# Patient Record
Sex: FEMALE | Race: WHITE | NOT HISPANIC OR LATINO | Employment: UNEMPLOYED | URBAN - METROPOLITAN AREA
[De-identification: names, ages, dates, MRNs, and addresses within clinical notes are randomized per-mention and may not be internally consistent; named-entity substitution may affect disease eponyms.]

---

## 2024-01-22 ENCOUNTER — APPOINTMENT (OUTPATIENT)
Dept: GENERAL RADIOLOGY | Facility: HOSPITAL | Age: 39
End: 2024-01-22
Payer: COMMERCIAL

## 2024-01-22 ENCOUNTER — HOSPITAL ENCOUNTER (EMERGENCY)
Facility: HOSPITAL | Age: 39
Discharge: HOME | End: 2024-01-22
Attending: EMERGENCY MEDICINE
Payer: COMMERCIAL

## 2024-01-22 VITALS
DIASTOLIC BLOOD PRESSURE: 97 MMHG | HEART RATE: 101 BPM | OXYGEN SATURATION: 100 % | SYSTOLIC BLOOD PRESSURE: 131 MMHG | WEIGHT: 150 LBS | TEMPERATURE: 98.4 F | RESPIRATION RATE: 15 BRPM | BODY MASS INDEX: 28.32 KG/M2 | HEIGHT: 61 IN

## 2024-01-22 DIAGNOSIS — R00.2 PALPITATIONS: ICD-10-CM

## 2024-01-22 DIAGNOSIS — R00.0 SINUS TACHYCARDIA: Primary | ICD-10-CM

## 2024-01-22 LAB
ALBUMIN SERPL-MCNC: 4.8 G/DL (ref 3.5–5.2)
ALBUMIN/GLOB SERPL: 1.7 G/DL
ALP SERPL-CCNC: 108 U/L (ref 39–117)
ALT SERPL W P-5'-P-CCNC: 10 U/L (ref 1–33)
ANION GAP SERPL CALCULATED.3IONS-SCNC: 9.3 MMOL/L (ref 5–15)
AST SERPL-CCNC: 13 U/L (ref 1–32)
BASOPHILS # BLD AUTO: 0.04 10*3/MM3 (ref 0–0.2)
BASOPHILS NFR BLD AUTO: 0.6 % (ref 0–1.5)
BILIRUB SERPL-MCNC: 0.3 MG/DL (ref 0–1.2)
BUN SERPL-MCNC: 15 MG/DL (ref 6–20)
BUN/CREAT SERPL: 19.2 (ref 7–25)
CALCIUM SPEC-SCNC: 10.1 MG/DL (ref 8.6–10.5)
CHLORIDE SERPL-SCNC: 101 MMOL/L (ref 98–107)
CO2 SERPL-SCNC: 25.7 MMOL/L (ref 22–29)
CREAT SERPL-MCNC: 0.78 MG/DL (ref 0.57–1)
D DIMER PPP FEU-MCNC: 0.14 MCGFEU/ML (ref 0–0.5)
DEPRECATED RDW RBC AUTO: 38.6 FL (ref 37–54)
EGFRCR SERPLBLD CKD-EPI 2021: 99.8 ML/MIN/1.73
EOSINOPHIL # BLD AUTO: 0.04 10*3/MM3 (ref 0–0.4)
EOSINOPHIL NFR BLD AUTO: 0.6 % (ref 0.3–6.2)
ERYTHROCYTE [DISTWIDTH] IN BLOOD BY AUTOMATED COUNT: 12.1 % (ref 12.3–15.4)
GLOBULIN UR ELPH-MCNC: 2.8 GM/DL
GLUCOSE SERPL-MCNC: 99 MG/DL (ref 65–99)
HCG SERPL QL: NEGATIVE
HCT VFR BLD AUTO: 42.1 % (ref 34–46.6)
HGB BLD-MCNC: 14.3 G/DL (ref 12–15.9)
IMM GRANULOCYTES # BLD AUTO: 0.02 10*3/MM3 (ref 0–0.05)
IMM GRANULOCYTES NFR BLD AUTO: 0.3 % (ref 0–0.5)
LYMPHOCYTES # BLD AUTO: 1.64 10*3/MM3 (ref 0.7–3.1)
LYMPHOCYTES NFR BLD AUTO: 22.9 % (ref 19.6–45.3)
MCH RBC QN AUTO: 29.4 PG (ref 26.6–33)
MCHC RBC AUTO-ENTMCNC: 34 G/DL (ref 31.5–35.7)
MCV RBC AUTO: 86.6 FL (ref 79–97)
MONOCYTES # BLD AUTO: 0.64 10*3/MM3 (ref 0.1–0.9)
MONOCYTES NFR BLD AUTO: 9 % (ref 5–12)
NEUTROPHILS NFR BLD AUTO: 4.77 10*3/MM3 (ref 1.7–7)
NEUTROPHILS NFR BLD AUTO: 66.6 % (ref 42.7–76)
PLATELET # BLD AUTO: 282 10*3/MM3 (ref 140–450)
PMV BLD AUTO: 10.1 FL (ref 6–12)
POTASSIUM SERPL-SCNC: 3.5 MMOL/L (ref 3.5–5.2)
PROT SERPL-MCNC: 7.6 G/DL (ref 6–8.5)
QT INTERVAL: 262 MS
QTC INTERVAL: 410 MS
RBC # BLD AUTO: 4.86 10*6/MM3 (ref 3.77–5.28)
SODIUM SERPL-SCNC: 136 MMOL/L (ref 136–145)
T3 SERPL-MCNC: 123 NG/DL (ref 80–200)
T4 SERPL-MCNC: 7.94 MCG/DL (ref 4.5–11.7)
TROPONIN T SERPL HS-MCNC: <6 NG/L
TSH SERPL DL<=0.05 MIU/L-ACNC: 4.72 UIU/ML (ref 0.27–4.2)
WBC NRBC COR # BLD AUTO: 7.15 10*3/MM3 (ref 3.4–10.8)

## 2024-01-22 PROCEDURE — 84436 ASSAY OF TOTAL THYROXINE: CPT | Performed by: EMERGENCY MEDICINE

## 2024-01-22 PROCEDURE — 84703 CHORIONIC GONADOTROPIN ASSAY: CPT | Performed by: EMERGENCY MEDICINE

## 2024-01-22 PROCEDURE — 25810000003 SODIUM CHLORIDE 0.9 % SOLUTION: Performed by: EMERGENCY MEDICINE

## 2024-01-22 PROCEDURE — 96360 HYDRATION IV INFUSION INIT: CPT

## 2024-01-22 PROCEDURE — 85379 FIBRIN DEGRADATION QUANT: CPT | Performed by: EMERGENCY MEDICINE

## 2024-01-22 PROCEDURE — 99284 EMERGENCY DEPT VISIT MOD MDM: CPT | Performed by: EMERGENCY MEDICINE

## 2024-01-22 PROCEDURE — 84484 ASSAY OF TROPONIN QUANT: CPT | Performed by: EMERGENCY MEDICINE

## 2024-01-22 PROCEDURE — 99284 EMERGENCY DEPT VISIT MOD MDM: CPT

## 2024-01-22 PROCEDURE — 93005 ELECTROCARDIOGRAM TRACING: CPT | Performed by: EMERGENCY MEDICINE

## 2024-01-22 PROCEDURE — 80050 GENERAL HEALTH PANEL: CPT | Performed by: EMERGENCY MEDICINE

## 2024-01-22 PROCEDURE — 84480 ASSAY TRIIODOTHYRONINE (T3): CPT | Performed by: EMERGENCY MEDICINE

## 2024-01-22 PROCEDURE — 71045 X-RAY EXAM CHEST 1 VIEW: CPT

## 2024-01-22 PROCEDURE — 93010 ELECTROCARDIOGRAM REPORT: CPT | Performed by: INTERNAL MEDICINE

## 2024-01-22 RX ORDER — SODIUM CHLORIDE 0.9 % (FLUSH) 0.9 %
10 SYRINGE (ML) INJECTION AS NEEDED
Status: DISCONTINUED | OUTPATIENT
Start: 2024-01-22 | End: 2024-01-22 | Stop reason: HOSPADM

## 2024-01-22 RX ORDER — DESVENLAFAXINE SUCCINATE 50 MG/1
50 TABLET, EXTENDED RELEASE ORAL DAILY
COMMUNITY

## 2024-01-22 RX ORDER — LEVOTHYROXINE SODIUM 0.05 MG/1
50 TABLET ORAL DAILY
COMMUNITY

## 2024-01-22 RX ORDER — HYDROXYZINE HYDROCHLORIDE 25 MG/1
25 TABLET, FILM COATED ORAL EVERY 4 HOURS PRN
COMMUNITY

## 2024-01-22 RX ADMIN — SODIUM CHLORIDE 1000 ML: 9 INJECTION, SOLUTION INTRAVENOUS at 08:45

## 2024-01-22 RX ADMIN — METOPROLOL TARTRATE 12.5 MG: 25 TABLET, FILM COATED ORAL at 10:17

## 2024-01-22 NOTE — DISCHARGE INSTRUCTIONS
Today your evaluation is very stable.  Specifically there is no evidence of a heart attack.  Likewise there is no evidence of a blood clot to the lung.    I did review your previous clinic visits.  You have persistently had an elevated heart rate.  I am going to initiate a twice daily low-dose beta-blocker called metoprolol.  It will be 1/2 tablet twice daily.  You did receive your first dose here today.  You may take your second dose this evening.    I sent an internal referral to cardiology.  They should call you on the telephone to help set up a follow-up appointment.    Return anytime for increased pain, palpitations, other difficulties    Please read all of the instructions in this handout.  If you receive prescriptions please fill them and take them as directed.  Please call your primary care physician for follow-up appointment in the next 5 to 7 days.  If you do not have a physician you may call the [facility] referral line at [phone].    You may return to the emergency department at any time for any concerns such as worsening symptoms.  If you received a work or school note it will be printed at the back of this packet.

## 2024-01-22 NOTE — FSED PROVIDER NOTE
"Subjective   History of Present Illness  Patient is a very nice 38-year-old white female.  She presents with an approximately 1 week history of rapid palpitations.  She states that over the last 24 hours she has felt a \"pinch\" in her left anterior chest.  Mild shortness of breath.  No calf pain or swelling.  Patient denies any increase in stimulants.  She actually states that she has reduced her caffeine intake over the last week.  No personal history of DVT or PE.  No family history of clotting disorder that she is aware of.  She does have a history of hypothyroidism and is on low-dose thyroid replacement.  No fever no chills or respiratory illness.      Review of Systems  Constitutional: No fevers, chills, sweats unless otherwise documented in HPI  Eyes: No recent visual problems, eye discharge, eye pain, redness unless otherwise documented in HPI  HEENT: No ear pain, nasal congestion, sore throat, voice changes unless otherwise documented in HPI  Respiratory: No shortness of breath, cough, pain on breathing, sputum production unless otherwise documented in HPI  Cardiovascular: No chest pain, palpitations, syncope, orthopnea unless otherwise documented in HPI  Gastrointestinal: No nausea, vomiting, diarrhea, constipation unless otherwise documented in HPI  Genitourinary: No hematuria, dysuria, incontinence unless otherwise documented in HPI  Endocrine: Negative for excessive thirst, excessive hunger, excessive urination, heat or cold intolerance unless otherwise documented in HPI  Musculoskeletal: No back pain, neck pain, joint pain, muscle pain, decreased range of motion unless otherwise documented in HPI  Integumentary: No rash, pruritus, abrasion, lesions unless otherwise documented in HPI  Neurologic: No weakness, numbness, frequent headaches, tremors unless otherwise documented in HPI  Psychiatric: No anxiety, depression, mood changes, hallucinations unless otherwise documented in HPI        History reviewed. " No pertinent past medical history.    Allergies   Allergen Reactions    Codeine Hallucinations    Amoxicillin Rash    Sulfamethoxazole-Trimethoprim Rash       History reviewed. No pertinent surgical history.    Family History   Problem Relation Age of Onset    No Known Problems Mother     No Known Problems Father        Social History     Socioeconomic History    Marital status:    Tobacco Use    Smoking status: Never     Passive exposure: Never    Smokeless tobacco: Never   Vaping Use    Vaping Use: Never used   Substance and Sexual Activity    Alcohol use: Not Currently    Drug use: Never    Sexual activity: Never           Objective   Physical Exam  Vital signs: Reviewed in nurses notes    General: Awake alert.  No acute distress.  She is slightly anxious at bedside    HEENT: Pupils equal round responsive to light.  Extra-ocular movements are intact.  No scleral icterus.  Nasopharynx is clear.  Oropharynx is clear with moist mucous membranes.  No masses noted    Neck:   Supple without lymphadenopathy.  No elevation of JVD    Respiratory:   Nonlabored respirations.  Clear to auscultation bilaterally.  Equal breath sounds bilaterally.  No wheezes or stridor noted.    Cardiovascular: Rate is tachycardic, rhythm regular.  I do not appreciate a murmur.  No pretibial or pedal edema.  No calf pain.  Pedal pulses are intact bilaterally    Abdomen: Nondistended    Skin:   Warm and dry.  No rashes noted    Neurological examination: Patient is awake alert oriented x4.  Speech is normal.  No facial palsy.  No focal motor or sensory deficits.    ECG 12 Lead      Date/Time: 1/22/2024 8:36 AM    Performed by: Simeon Gloria MD  Authorized by: Simeon Gloria MD  Interpreted by physician  Rhythm: sinus tachycardia  Comments: Sinus tachycardia rate of 147.  Nonspecific ST changes      Lab Results (last 72 hours)       Procedure Component Value Units Date/Time    CBC & Differential [[id number]]  (Abnormal)  Collected: 01/22/24 0841    Specimen: Blood Updated: 01/22/24 0846    Narrative:      The following orders were created for panel order CBC & Differential.  Procedure                               Abnormality         Status                     ---------                               -----------         ------                     CBC Auto Differential[[id number]]        Abnormal            Final result                 Please view results for these tests on the individual orders.    Comprehensive Metabolic Panel [[id number]] Collected: 01/22/24 0841    Specimen: Blood Updated: 01/22/24 0914     Glucose 99 mg/dL      BUN 15 mg/dL      Creatinine 0.78 mg/dL      Sodium 136 mmol/L      Potassium 3.5 mmol/L      Chloride 101 mmol/L      CO2 25.7 mmol/L      Calcium 10.1 mg/dL      Total Protein 7.6 g/dL      Albumin 4.8 g/dL      ALT (SGPT) 10 U/L      AST (SGOT) 13 U/L      Alkaline Phosphatase 108 U/L      Total Bilirubin 0.3 mg/dL      Globulin 2.8 gm/dL      A/G Ratio 1.7 g/dL      BUN/Creatinine Ratio 19.2     Anion Gap 9.3 mmol/L      eGFR 99.8 mL/min/1.73     Narrative:      GFR Normal >60  Chronic Kidney Disease <60  Kidney Failure <15      High Sensitivity Troponin T [[id number]]  (Normal) Collected: 01/22/24 0841    Specimen: Blood Updated: 01/22/24 0914     HS Troponin T <6 ng/L     Narrative:      High Sensitive Troponin T Reference Range:  <14.0 ng/L- Negative Female for AMI  <22.0 ng/L- Negative Male for AMI  >=14 - Abnormal Female indicating possible myocardial injury.  >=22 - Abnormal Male indicating possible myocardial injury.   Clinicians would have to utilize clinical acumen, EKG, Troponin, and serial changes to determine if it is an Acute Myocardial Infarction or myocardial injury due to an underlying chronic condition.         hCG, Serum, Qualitative [[id number]]  (Normal) Collected: 01/22/24 0841    Specimen: Blood Updated: 01/22/24 0905     HCG Qualitative Negative    CBC Auto Differential [[id number]]   "(Abnormal) Collected: 01/22/24 0841    Specimen: Blood Updated: 01/22/24 0846     WBC 7.15 10*3/mm3      RBC 4.86 10*6/mm3      Hemoglobin 14.3 g/dL      Hematocrit 42.1 %      MCV 86.6 fL      MCH 29.4 pg      MCHC 34.0 g/dL      RDW 12.1 %      RDW-SD 38.6 fl      MPV 10.1 fL      Platelets 282 10*3/mm3      Neutrophil % 66.6 %      Lymphocyte % 22.9 %      Monocyte % 9.0 %      Eosinophil % 0.6 %      Basophil % 0.6 %      Immature Grans % 0.3 %      Neutrophils, Absolute 4.77 10*3/mm3      Lymphocytes, Absolute 1.64 10*3/mm3      Monocytes, Absolute 0.64 10*3/mm3      Eosinophils, Absolute 0.04 10*3/mm3      Basophils, Absolute 0.04 10*3/mm3      Immature Grans, Absolute 0.02 10*3/mm3     TSH [[id number]] Collected: 01/22/24 0841    Specimen: Blood Updated: 01/22/24 0845    T4 [[id number]] Collected: 01/22/24 0841    Specimen: Blood Updated: 01/22/24 0845    T3 [[id number]] Collected: 01/22/24 0841    Specimen: Blood Updated: 01/22/24 0845    D-dimer, Quantitative [[id number]]  (Normal) Collected: 01/22/24 0845    Specimen: Blood Updated: 01/22/24 0904     D-Dimer, Quantitative 0.14 MCGFEU/mL     Narrative:      According to the assay 's published package insert, a normal (<0.50 MCGFEU/mL) D-dimer result in conjunction with a non-high clinical probability assessment, excludes deep vein thrombosis (DVT) and pulmonary embolism (PE) with high sensitivity.    D-dimer values increase with age and this can make VTE exclusion of an older population difficult. To address this, the [employer], based on best available evidence and recent guidelines, recommends that clinicians use age-adjusted D-dimer thresholds in patients greater than 50 years of age with: a) a low probability of PE who do not meet all Pulmonary Embolism Rule Out Criteria, or b) in those with intermediate probability of PE.   The formula for an age-adjusted D-dimer cut-off is \"age/100\".  For example, a 60 year old patient " would have an age-adjusted cut-off of 0.60 MCGFEU/mL and an 80 year old 0.80 MCGFEU/mL.             XR Chest 1 View    Result Date: 1/22/2024  PORTABLE CHEST X-RAY  HISTORY: Palpitations with fluttering sensation in the chest.  Portable chest x-ray is provided. Correlation: None.  FINDINGS: The cardiomediastinal silhouette is normal. The lungs are clear. The costophrenic sulci are dry and the bones appear normal. There is no pneumothorax.      Negative.  This report was finalized on 1/22/2024 8:57 AM by Dr. Michael Hinojosa M.D on Workstation: [id number]        Medications   sodium chloride 0.9 % flush 10 mL (has no administration in time range)   metoprolol tartrate (LOPRESSOR) tablet 12.5 mg (has no administration in time range)   sodium chloride 0.9 % bolus 1,000 mL (1,000 mL Intravenous New Bag 1/22/24 0845)             ED Course  ED Course as of 01/22/24 1013   Mon Jan 22, 2024   1005 Patient is very stable at this time.  Current SaO2 is 100% on room air, heart rate 97.  I did review many of her previous clinic visits on [facility].  Her heart rate has persistently been elevated.  For example on 11/3/2023 was 119, on 10/10/2023 was 135, on 7/17/2023 was 117.  She likewise has had some consistent slight elevations of blood pressure.    Plan: She appears to be very stable today.  Her D-dimer is negative, troponin negative.  I am going to initiate a low-dose Lopressor twice daily at 12.5 with cardiology referral. [TC]      ED Course User Index  [TC] Simeon Gloria MD                                         DDx: Sinus arrhythmia, SVT, PE, atypical ischemia, hyper thyroidism  Medical Decision Making  Problems Addressed:  Palpitations: complicated acute illness or injury  Sinus tachycardia: complicated acute illness or injury    Amount and/or Complexity of Data Reviewed  Labs: ordered.  Radiology: ordered.  ECG/medicine tests: ordered.    Risk  Prescription drug management.    The x-rays were independently  reviewed as well as review of the radiologist interpretation  Upon discharge patient noted to be very stable.  Appropriate treatment plan and return precautions discussed    Final diagnoses:   Sinus tachycardia   Palpitations       ED Disposition  ED Disposition       ED Disposition   Discharge    Condition   Stable    Comment   --               Marleny Morales MD  [address] [id number] [phone]               Medication List        New Prescriptions      metoprolol tartrate 25 MG tablet  Commonly known as: LOPRESSOR  1/2 tab po bid               Where to Get Your Medications        These medications were sent to [facility] [id number] - [place], [place] - [address] AT [facility] - [phone]  - [phone]   [address] , [facility] [address]      Phone: [phone]   metoprolol tartrate 25 MG tablet

## 2024-01-31 ENCOUNTER — OFFICE VISIT (OUTPATIENT)
Dept: CARDIOLOGY | Facility: CLINIC | Age: 39
End: 2024-01-31
Payer: COMMERCIAL

## 2024-01-31 VITALS
DIASTOLIC BLOOD PRESSURE: 92 MMHG | HEIGHT: 61 IN | OXYGEN SATURATION: 100 % | BODY MASS INDEX: 28.89 KG/M2 | HEART RATE: 85 BPM | WEIGHT: 153 LBS | SYSTOLIC BLOOD PRESSURE: 136 MMHG

## 2024-01-31 DIAGNOSIS — R00.0 SINUS TACHYCARDIA: Primary | ICD-10-CM

## 2024-01-31 DIAGNOSIS — R00.2 PALPITATIONS: ICD-10-CM

## 2024-01-31 PROCEDURE — 93000 ELECTROCARDIOGRAM COMPLETE: CPT | Performed by: INTERNAL MEDICINE

## 2024-01-31 PROCEDURE — 99204 OFFICE O/P NEW MOD 45 MIN: CPT | Performed by: INTERNAL MEDICINE

## 2024-01-31 NOTE — PROGRESS NOTES
"      CARDIOLOGY    Marleny Morales MD    ENCOUNTER DATE:  01/31/2024    Marleny Wen / 38 y.o. / female        CHIEF COMPLAINT / REASON FOR OFFICE VISIT     sinus tachycardia      HISTORY OF PRESENT ILLNESS       HPI    Marleny Wen is a 38 y.o. female     This is a nice lady who was noticing palpitations for about 2 weeks and usually it was first thing in the morning when she woke up and it would last for about an hour.  No associated symptoms.  She woke up the morning of 01/22/2024 and had a pinching type pain in her chest so she went to the emergency room where she had a negative D-dimer and troponin.  She was not anemic.  TSH was a little high.  Blood pressure was elevated.  She was started on metoprolol tartrate 12.5 mg bid.  Since then, her heart rate has been better.  She has not had any palpitations.  She notices her heart does not go as fast when she is on the treadmill.  She was having some neck pain but that has gone away since being on metoprolol as well.  No exertional symptoms.          REVIEW OF SYSTEMS     Review of Systems   Constitutional: Negative for chills, fever, weight gain and weight loss.   Cardiovascular:  Negative for leg swelling.   Respiratory:  Positive for snoring. Negative for cough and wheezing.    Hematologic/Lymphatic: Negative for bleeding problem. Does not bruise/bleed easily.   Skin:  Negative for color change.   Musculoskeletal:  Negative for falls, joint pain and myalgias.   Gastrointestinal:  Negative for melena.   Genitourinary:  Negative for hematuria.   Neurological:  Negative for excessive daytime sleepiness.   Psychiatric/Behavioral:  Negative for depression. The patient is nervous/anxious.          VITAL SIGNS     Visit Vitals  /92   Pulse 85   Ht 153.7 cm (60.5\")   Wt 69.4 kg (153 lb)   LMP 01/15/2024   SpO2 100%   BMI 29.39 kg/m²         Wt Readings from Last 3 Encounters:   01/31/24 69.4 kg (153 lb)   01/22/24 68 kg (150 lb)   11/15/22 71.2 kg (157 " lb)     Body mass index is 29.39 kg/m².      PHYSICAL EXAMINATION     Constitutional:       General: Not in acute distress.  Neck:      Vascular: No carotid bruit or JVD.   Pulmonary:      Effort: Pulmonary effort is normal.      Breath sounds: Normal breath sounds.   Cardiovascular:      Normal rate. Regular rhythm.      Murmurs: There is no murmur.   Psychiatric:         Mood and Affect: Mood and affect normal.           REVIEWED DATA       ECG 12 Lead    Date/Time: 1/31/2024 9:43 AM  Performed by: Marleny Morales MD    Authorized by: Marleny Morales MD  Comparison: compared with previous ECG from 1/22/2024  Comparison to previous ECG: Heart rate is slower  Rhythm: sinus rhythm  BPM: 85  Conduction: conduction normal  Other findings: non-specific ST-T wave changes    Clinical impression: abnormal EKG                Lab Results   Component Value Date    GLUCOSE 99 01/22/2024    BUN 15 01/22/2024    CREATININE 0.78 01/22/2024    EGFR 99.8 01/22/2024    BCR 19.2 01/22/2024    K 3.5 01/22/2024    CO2 25.7 01/22/2024    CALCIUM 10.1 01/22/2024    ALBUMIN 4.8 01/22/2024    BILITOT 0.3 01/22/2024    AST 13 01/22/2024    ALT 10 01/22/2024       ASSESSMENT & PLAN      Diagnosis Plan   1. Sinus tachycardia  Adult Transthoracic Echo Complete W/ Cont if Necessary Per Protocol      2. Palpitations  Adult Transthoracic Echo Complete W/ Cont if Necessary Per Protocol          Tachycardia.  I reviewed her EKG from the ER and she was in sinus tachycardia in the 140's with nonspecific ST changes, potentially polarization abnormality.  Her heart rate is much better today.  She is feeling better on the metoprolol.    Hypertension.  Her blood pressure has been elevated.  She has been monitoring it at home and has been getting similar numbers as we got here today.  I am going to increase the metoprolol to 25 mg b.i.d. with the intention of eventually switching that over to metoprolol succinate for ease of taking.  She may need an  additional medication to get her blood pressure under better control.  I would consider probably adding some amlodipine.   Abnormal EKG.  She has some nonspecific ST changes which could be repolarization.  I am going to check an echocardiogram to assess her wall motion and wall thickness.    Anxiety.    Hypothyroid, on replacement with slightly elevated TSH.      She is going to followup with one of our nurse practitioners in about 2 weeks and get an echocardiogram as well.          Orders Placed This Encounter   Procedures    ECG 12 Lead     This order was created via procedure documentation     Order Specific Question:   Release to patient     Answer:   Routine Release [[id number]]    Adult Transthoracic Echo Complete W/ Cont if Necessary Per Protocol     Standing Status:   Future     Standing Expiration Date:   1/30/2025     Order Specific Question:   Reason for exam?     Answer:   Palpitations     Order Specific Question:   Release to patient     Answer:   Routine Release [[id number]]           MEDICATIONS         Discharge Medications            Accurate as of January 31, 2024  9:44 AM. If you have any questions, ask your nurse or doctor.                Changes to Medications        Instructions Start Date   metoprolol tartrate 25 MG tablet  Commonly known as: LOPRESSOR  What changed:   how much to take  how to take this  when to take this  additional instructions  Changed by: Marleny Morales MD   25 mg, Oral, 2 Times Daily             Continue These Medications        Instructions Start Date   Atogepant 30 MG tablet   30 mg, Oral, Daily      cetirizine 5 MG tablet  Commonly known as: zyrTEC   5 mg, Oral, Daily      desvenlafaxine 50 MG 24 hr tablet  Commonly known as: PRISTIQ   50 mg, Oral, Daily      hydrOXYzine 25 MG tablet  Commonly known as: ATARAX   25 mg, Oral, Every 4 Hours PRN      levothyroxine 50 MCG tablet  Commonly known as: SYNTHROID, LEVOTHROID   50 mcg, Oral, Daily      Rimegepant Sulfate 75  MG tablet dispersible tablet  Commonly known as: NURTEC   1 tablet, Oral, Daily PRN             Stop These Medications      Danie Thyroid 30 MG tablet  Generic drug: Thyroid  Stopped by: Marleny Morales MD     busPIRone 15 MG tablet  Commonly known as: BUSPAR  Stopped by: Marleny Morales MD     ipratropium 0.06 % nasal spray  Commonly known as: ATROVENT  Stopped by: Marleny Morales MD     methylPREDNISolone 4 MG dose pack  Commonly known as: MEDROL  Stopped by: Marleny Morales MD     montelukast 10 MG tablet  Commonly known as: SINGULAIR  Stopped by: Marleny Morales MD     Progesterone 100 MG capsule  Commonly known as: PROMETRIUM  Stopped by: Marleny Morales MD     promethazine-dextromethorphan 6.25-15 MG/5ML syrup  Commonly known as: PROMETHAZINE-DM  Stopped by: Marleny Morales MD     sertraline 50 MG tablet  Commonly known as: ZOLOFT  Stopped by: Marleny Morales MD     SUMAtriptan 50 MG tablet  Commonly known as: IMITREX  Stopped by: MD Marleny Morris MD  01/31/24  09:44 EST    Part of this note may be an electronic transcription/translation of spoken language to printed text using the Dragon dictation system.

## 2024-02-02 ENCOUNTER — PATIENT ROUNDING (BHMG ONLY) (OUTPATIENT)
Dept: CARDIOLOGY | Facility: CLINIC | Age: 39
End: 2024-02-02
Payer: COMMERCIAL

## 2024-02-02 NOTE — PROGRESS NOTES
A My Chart message has been sent to the patient for PATIENT ROUNDING with [facility]

## 2024-02-15 ENCOUNTER — HOSPITAL ENCOUNTER (OUTPATIENT)
Dept: CARDIOLOGY | Facility: HOSPITAL | Age: 39
Discharge: HOME | End: 2024-02-15
Admitting: INTERNAL MEDICINE
Payer: COMMERCIAL

## 2024-02-15 ENCOUNTER — OFFICE VISIT (OUTPATIENT)
Age: 39
End: 2024-02-15
Payer: COMMERCIAL

## 2024-02-15 VITALS
SYSTOLIC BLOOD PRESSURE: 130 MMHG | BODY MASS INDEX: 35.41 KG/M2 | OXYGEN SATURATION: 98 % | HEART RATE: 87 BPM | HEIGHT: 55 IN | DIASTOLIC BLOOD PRESSURE: 85 MMHG | WEIGHT: 153 LBS

## 2024-02-15 VITALS
HEIGHT: 60 IN | DIASTOLIC BLOOD PRESSURE: 82 MMHG | WEIGHT: 153 LBS | SYSTOLIC BLOOD PRESSURE: 140 MMHG | HEART RATE: 96 BPM | BODY MASS INDEX: 30.04 KG/M2

## 2024-02-15 DIAGNOSIS — R00.0 SINUS TACHYCARDIA: Primary | ICD-10-CM

## 2024-02-15 DIAGNOSIS — E78.2 MIXED HYPERLIPIDEMIA: ICD-10-CM

## 2024-02-15 DIAGNOSIS — I10 PRIMARY HYPERTENSION: ICD-10-CM

## 2024-02-15 DIAGNOSIS — R00.0 SINUS TACHYCARDIA: ICD-10-CM

## 2024-02-15 DIAGNOSIS — R00.2 PALPITATIONS: ICD-10-CM

## 2024-02-15 LAB
AORTIC ARCH: 2.5 CM
AORTIC DIMENSIONLESS INDEX: 0.8 (DI)
ASCENDING AORTA: 2.6 CM
BH CV ECHO LEFT VENTRICLE GLOBAL LONGITUDINAL STRAIN: -21.3 %
BH CV ECHO MEAS - ACS: 1.79 CM
BH CV ECHO MEAS - AO MAX PG: 4.8 MMHG
BH CV ECHO MEAS - AO MEAN PG: 3 MMHG
BH CV ECHO MEAS - AO ROOT DIAM: 2.7 CM
BH CV ECHO MEAS - AO V2 MAX: 110 CM/SEC
BH CV ECHO MEAS - AO V2 VTI: 20.5 CM
BH CV ECHO MEAS - AVA(I,D): 2.5 CM2
BH CV ECHO MEAS - EDV(CUBED): 85.2 ML
BH CV ECHO MEAS - EDV(MOD-SP2): 61 ML
BH CV ECHO MEAS - EDV(MOD-SP4): 60 ML
BH CV ECHO MEAS - EF(MOD-BP): 63.7 %
BH CV ECHO MEAS - EF(MOD-SP2): 65.6 %
BH CV ECHO MEAS - EF(MOD-SP4): 60 %
BH CV ECHO MEAS - EF_3D-VOL: 60 %
BH CV ECHO MEAS - ESV(CUBED): 20.5 ML
BH CV ECHO MEAS - ESV(MOD-SP2): 21 ML
BH CV ECHO MEAS - ESV(MOD-SP4): 24 ML
BH CV ECHO MEAS - FS: 37.8 %
BH CV ECHO MEAS - IVS/LVPW: 1 CM
BH CV ECHO MEAS - IVSD: 0.8 CM
BH CV ECHO MEAS - LA 3D VOL INDEX: 15
BH CV ECHO MEAS - LAT PEAK E' VEL: 15.8 CM/SEC
BH CV ECHO MEAS - LV DIASTOLIC VOL/BSA (35-75): 36 CM2
BH CV ECHO MEAS - LV MASS(C)D: 109.4 GRAMS
BH CV ECHO MEAS - LV MAX PG: 3.1 MMHG
BH CV ECHO MEAS - LV MEAN PG: 2 MMHG
BH CV ECHO MEAS - LV SYSTOLIC VOL/BSA (12-30): 14.4 CM2
BH CV ECHO MEAS - LV V1 MAX: 88.7 CM/SEC
BH CV ECHO MEAS - LV V1 VTI: 15.9 CM
BH CV ECHO MEAS - LVIDD: 4.4 CM
BH CV ECHO MEAS - LVIDS: 2.7 CM
BH CV ECHO MEAS - LVOT AREA: 3.3 CM2
BH CV ECHO MEAS - LVOT DIAM: 2.04 CM
BH CV ECHO MEAS - LVPWD: 0.8 CM
BH CV ECHO MEAS - MED PEAK E' VEL: 10.3 CM/SEC
BH CV ECHO MEAS - MV A DUR: 0.13 SEC
BH CV ECHO MEAS - MV A MAX VEL: 60.8 CM/SEC
BH CV ECHO MEAS - MV DEC SLOPE: 372.8 CM/SEC2
BH CV ECHO MEAS - MV DEC TIME: 0.17 SEC
BH CV ECHO MEAS - MV E MAX VEL: 73.3 CM/SEC
BH CV ECHO MEAS - MV E/A: 1.21
BH CV ECHO MEAS - MV MAX PG: 2.7 MMHG
BH CV ECHO MEAS - MV MEAN PG: 1.48 MMHG
BH CV ECHO MEAS - MV P1/2T: 59.4 MSEC
BH CV ECHO MEAS - MV V2 VTI: 17.2 CM
BH CV ECHO MEAS - MVA(P1/2T): 3.7 CM2
BH CV ECHO MEAS - MVA(VTI): 3 CM2
BH CV ECHO MEAS - PA ACC TIME: 0.19 SEC
BH CV ECHO MEAS - PA V2 MAX: 103.2 CM/SEC
BH CV ECHO MEAS - PULM A REVS DUR: 0.13 SEC
BH CV ECHO MEAS - PULM A REVS VEL: 31.7 CM/SEC
BH CV ECHO MEAS - PULM DIAS VEL: 48 CM/SEC
BH CV ECHO MEAS - PULM S/D: 1.24
BH CV ECHO MEAS - PULM SYS VEL: 59.6 CM/SEC
BH CV ECHO MEAS - QP/QS: 0.49
BH CV ECHO MEAS - RAP SYSTOLE: 3 MMHG
BH CV ECHO MEAS - RV MAX PG: 1.65 MMHG
BH CV ECHO MEAS - RV V1 MAX: 64.3 CM/SEC
BH CV ECHO MEAS - RV V1 VTI: 14.1 CM
BH CV ECHO MEAS - RVOT DIAM: 1.52 CM
BH CV ECHO MEAS - RVSP: 24.6 MMHG
BH CV ECHO MEAS - SI(MOD-SP2): 24 ML/M2
BH CV ECHO MEAS - SI(MOD-SP4): 21.6 ML/M2
BH CV ECHO MEAS - SUP REN AO DIAM: 1.6 CM
BH CV ECHO MEAS - SV(LVOT): 51.8 ML
BH CV ECHO MEAS - SV(MOD-SP2): 40 ML
BH CV ECHO MEAS - SV(MOD-SP4): 36 ML
BH CV ECHO MEAS - SV(RVOT): 25.5 ML
BH CV ECHO MEAS - TAPSE (>1.6): 2.16 CM
BH CV ECHO MEAS - TR MAX PG: 21.6 MMHG
BH CV ECHO MEAS - TR MAX VEL: 232.1 CM/SEC
BH CV ECHO MEASUREMENTS AVERAGE E/E' RATIO: 5.62
BH CV XLRA - RV BASE: 2.6 CM
BH CV XLRA - RV LENGTH: 7.1 CM
BH CV XLRA - RV MID: 2.05 CM
BH CV XLRA - TDI S': 11 CM/SEC
LEFT ATRIUM VOLUME INDEX: 22 ML/M2
SINUS: 2.7 CM
STJ: 2.7 CM

## 2024-02-15 PROCEDURE — 93356 MYOCRD STRAIN IMG SPCKL TRCK: CPT

## 2024-02-15 PROCEDURE — 93306 TTE W/DOPPLER COMPLETE: CPT

## 2024-02-15 RX ORDER — DAPSONE 50 MG/G
GEL TOPICAL
COMMUNITY
Start: 2024-02-13

## 2024-02-15 RX ORDER — METOPROLOL SUCCINATE 25 MG/1
25 TABLET, EXTENDED RELEASE ORAL DAILY
Qty: 30 TABLET | Refills: 11 | Status: SHIPPED | OUTPATIENT
Start: 2024-02-15

## 2024-02-15 RX ORDER — AMLODIPINE BESYLATE 2.5 MG/1
2.5 TABLET ORAL DAILY
Qty: 30 TABLET | Refills: 11 | Status: SHIPPED | OUTPATIENT
Start: 2024-02-15

## 2024-02-27 ENCOUNTER — PATIENT MESSAGE (OUTPATIENT)
Dept: CARDIOLOGY | Facility: CLINIC | Age: 39
End: 2024-02-27
Payer: COMMERCIAL

## 2024-02-29 NOTE — TELEPHONE ENCOUNTER
From: Marleny Wen  To: Abbie Neal  Sent: 2/27/2024 9:48 PM EST  Subject: Metoprolol ER    I just noticed that I am taking 25mg of the new metoprolol er, but was 25mg twice a day of the original metoprolol. Should I be taking 50mg of the er? My heart rate has not been managed the last several days on the 25mg er, which is what caused me to double check the scripts.

## 2024-03-14 ENCOUNTER — OFFICE VISIT (OUTPATIENT)
Age: 39
End: 2024-03-14
Payer: COMMERCIAL

## 2024-03-14 VITALS
DIASTOLIC BLOOD PRESSURE: 84 MMHG | SYSTOLIC BLOOD PRESSURE: 118 MMHG | HEART RATE: 83 BPM | HEIGHT: 55 IN | OXYGEN SATURATION: 99 % | BODY MASS INDEX: 35.18 KG/M2 | WEIGHT: 152 LBS

## 2024-03-14 DIAGNOSIS — R00.2 PALPITATIONS: ICD-10-CM

## 2024-03-14 DIAGNOSIS — R00.0 SINUS TACHYCARDIA: Primary | ICD-10-CM

## 2024-03-14 DIAGNOSIS — I10 PRIMARY HYPERTENSION: ICD-10-CM

## 2024-03-14 NOTE — PROGRESS NOTES
CARDIOLOGY        Patient Name: Marleny Wen  :1985  Age: 38 y.o.  Primary Cardiologist: Marleny Morales MD  Encounter Provider:  MARICRUZ Holt    Date of Service: 24    CHIEF COMPLAINT / REASON FOR OFFICE VISIT     sinus tachycardia      HISTORY OF PRESENT ILLNESS       HPI  Marleny Wen is a 38 y.o. female who presents today for 4-week reevaluation.     Pt has a  history significant for tachycardia.    Patient was evaluated by MARICRUZ Tang 2 weeks ago.  Patient was noting tachycardia in the mornings that lasts for approximately 1 hour.  On the morning of 2024 she also had a pinching chest discomfort and was evaluated in the emergency department.  At that time she had a negative D-dimer as well as high-sensitivity troponin.  She was not found to be anemic.  TSH was slightly elevated as well as her blood pressure.  Patient was started on metoprolol tartrate 12.5 mg twice daily.  When patient was evaluated 2 weeks ago heart rate was more controlled but her blood pressure remained elevated.  At that time her metoprolol tartrate dose was increased.  Patient also had an echocardiogram that was arranged and performed today secondary to nonspecific ST changes on ECG.    Patient had an echocardiogram performed in clinic today which revealed preserved LVEF with no significant valvular abnormalities.    At last appointment, metoprolol tartrate dose was adjusted. She reports that on this current dose she feels improvement of her symptoms, she has not noted any heart palpitations.  She exercised yesterday and HR was in the 130s and she did not have exertional symptoms, her HR returned to baseline with rest.  Her BP has been more controlled.  She denies chest pain, shortness of breath, CASTANEDA.  She is waking more rested and is no longer requiring naps.     The following portions of the patient's history were reviewed and updated as appropriate: allergies, current medications, past family  "history, past medical history, past social history, past surgical history and problem list.      VITAL SIGNS     Visit Vitals  /84   Pulse 83   Ht 60.5 cm (23.82\")   Wt 68.9 kg (152 lb)   SpO2 99%   .35 kg/m²         Wt Readings from Last 3 Encounters:   03/14/24 68.9 kg (152 lb)   02/15/24 69.4 kg (153 lb)   02/15/24 69.4 kg (153 lb)     Body mass index is 188.35 kg/m².      REVIEW OF SYSTEMS   Review of Systems   Constitutional: Negative for chills, fever, malaise/fatigue, weight gain and weight loss.   Cardiovascular:  Negative for leg swelling.   Respiratory:  Negative for cough, snoring and wheezing.    Hematologic/Lymphatic: Negative for bleeding problem. Does not bruise/bleed easily.   Skin:  Negative for color change.   Musculoskeletal:  Negative for falls, joint pain and myalgias.   Gastrointestinal:  Negative for melena.   Genitourinary:  Negative for hematuria.   Neurological:  Negative for excessive daytime sleepiness.   Psychiatric/Behavioral:  Negative for depression. The patient is not nervous/anxious.            PHYSICAL EXAMINATION     Vitals and nursing note reviewed.   Constitutional:       Appearance: Normal appearance. Well-developed.   Eyes:      Conjunctiva/sclera: Conjunctivae normal.   Neck:      Vascular: No carotid bruit.   Pulmonary:      Breath sounds: Normal breath sounds.   Cardiovascular:      Normal rate. Regular rhythm. Normal S1 with normal intensity. Normal S2 with normal intensity.       Murmurs: There is no murmur.      No gallop.  No click. No rub.   Edema:     Peripheral edema absent.   Musculoskeletal: Normal range of motion. Skin:     General: Skin is warm and dry.   Neurological:      Mental Status: Alert and oriented to person, place, and time.      GCS: GCS eye subscore is 4. GCS verbal subscore is 5. GCS motor subscore is 6.   Psychiatric:         Speech: Speech normal.         Behavior: Behavior normal.         Thought Content: Thought content normal.       " "  Judgment: Judgment normal.           REVIEWED DATA     Procedures    Cardiac Procedures:  Echocardiogram 2/15/2024.  Preserved LVEF.  Normal diastolic function.  Normal structure and function of the cardiac valves.        Lab Results   Component Value Date     01/22/2024     01/06/2023    K 3.5 01/22/2024    K 4.3 01/06/2023     01/22/2024     01/06/2023    CO2 25.7 01/22/2024    CO2 18 (L) 01/06/2023    BUN 15 01/22/2024    BUN 14 01/06/2023    CREATININE 0.78 01/22/2024    CREATININE 0.76 01/06/2023    EGFRIFAFRI >60 01/06/2023    GLUCOSE 99 01/22/2024    CALCIUM 10.1 01/22/2024    CALCIUM 9.4 01/06/2023    ALBUMIN 4.8 01/22/2024    ALBUMIN 4.5 01/06/2023    BILITOT 0.3 01/22/2024    BILITOT 0.4 01/06/2023    AST 13 01/22/2024    AST 28 01/06/2023    ALT 10 01/22/2024    ALT 11 01/06/2023     Lab Results   Component Value Date    WBC 7.15 01/22/2024    WBC 5.31 01/06/2023    HGB 14.3 01/22/2024    HGB 13.8 01/06/2023    HCT 42.1 01/22/2024    HCT 42.7 01/06/2023    MCV 86.6 01/22/2024    MCV 88.4 01/06/2023     01/22/2024     01/06/2023     No results found for: \"PROBNP\", \"BNP\"  Lab Results   Component Value Date    TROPONINT <6 01/22/2024     Lab Results   Component Value Date    TSH 4.720 (H) 01/22/2024    TSH 2.850 01/10/2022             ASSESSMENT & PLAN     Diagnoses and all orders for this visit:    1. Sinus tachycardia (Primary)  Assessment & Plan:  Patient reports that on twice daily dosing of metoprolol succinate she feels that her symptoms of tachycardia are more controlled  Heart rate 83 in clinic today  She has had abnormal TSH in the past and has an upcoming appointment for endocrinology      2. Palpitations  Assessment & Plan:  Improved with current dose of metoprolol succinate      3. Primary hypertension  Assessment & Plan:  Blood pressure controlled in clinic at 118/84  Continue the following regimen:  Amlodipine 2.5 mg/day  Metoprolol succinate 25 mg twice " daily            Return in about 6 months (around 9/14/2024) for MARICRUZ Rosario.    Future Appointments         Provider Department Center    3/28/2024 3:00 PM Nokhbehzaeim, Mahrokh, MD [facility] [place]    9/18/2024 11:00 AM Marleny Morales MD [facility] [place]              MEDICATIONS         Discharge Medications            Accurate as of March 14, 2024 10:04 AM. If you have any questions, ask your nurse or doctor.                Continue These Medications        Instructions Start Date   amLODIPine 2.5 MG tablet  Commonly known as: NORVASC   2.5 mg, Oral, Daily      Atogepant 30 MG tablet   30 mg, Oral, Daily      cetirizine 5 MG tablet  Commonly known as: zyrTEC   5 mg, Oral, Daily      Dapsone 5 % topical gel       desvenlafaxine 50 MG 24 hr tablet  Commonly known as: PRISTIQ   50 mg, Oral, Daily      hydrOXYzine 25 MG tablet  Commonly known as: ATARAX   25 mg, Oral, Every 4 Hours PRN      levothyroxine 50 MCG tablet  Commonly known as: SYNTHROID, LEVOTHROID   50 mcg, Oral, Daily      metoprolol tartrate 25 MG tablet  Commonly known as: LOPRESSOR   25 mg, Oral, 2 Times Daily      Rimegepant Sulfate 75 MG tablet dispersible tablet  Commonly known as: NURTEC   1 tablet, Oral, Daily PRN      tretinoin 0.025 % cream  Commonly known as: RETIN-A                    **Dragon Disclaimer:   Much of this encounter note is an electronic transcription/translation of spoken language to printed text. The electronic translation of spoken language may permit erroneous, or at times, nonsensical words or phrases to be inadvertently transcribed. Although I have reviewed the note for such errors, some may still exist.

## 2024-03-15 PROBLEM — R00.2 PALPITATIONS: Status: ACTIVE | Noted: 2024-03-15

## 2024-03-15 NOTE — ASSESSMENT & PLAN NOTE
Blood pressure controlled in clinic at 118/84  Continue the following regimen:  Amlodipine 2.5 mg/day  Metoprolol succinate 25 mg twice daily

## 2024-03-28 ENCOUNTER — OFFICE VISIT (OUTPATIENT)
Dept: ENDOCRINOLOGY | Age: 39
End: 2024-03-28
Payer: COMMERCIAL

## 2024-03-28 VITALS
BODY MASS INDEX: 29.38 KG/M2 | HEART RATE: 72 BPM | WEIGHT: 155.6 LBS | TEMPERATURE: 97.2 F | SYSTOLIC BLOOD PRESSURE: 112 MMHG | OXYGEN SATURATION: 98 % | DIASTOLIC BLOOD PRESSURE: 74 MMHG | HEIGHT: 61 IN

## 2024-03-28 DIAGNOSIS — E03.9 ACQUIRED HYPOTHYROIDISM: Primary | ICD-10-CM

## 2024-03-28 DIAGNOSIS — R00.0 TACHYCARDIA: ICD-10-CM

## 2024-03-28 PROCEDURE — 99204 OFFICE O/P NEW MOD 45 MIN: CPT | Performed by: STUDENT IN AN ORGANIZED HEALTH CARE EDUCATION/TRAINING PROGRAM

## 2024-03-28 NOTE — ASSESSMENT & PLAN NOTE
Evaluated by cardiologist  Takes metoprolol 25 mg twice daily  Given normal TSH in February it is unlikely to be related to the thyroid  Pristiq might be associated with tachycardia  Instructed to stay hydrated  will repeat TFT  Due to heavy menstrual cycles will assess her for iron deficiency anemia

## 2024-03-28 NOTE — PROGRESS NOTES
Chief Complaint  sinus tachycardia  and elevated TSH    Subjective        Marleny Wen presents to [facility]  to establish care.      History of Present Illness      Referred for further management of sinus tachycardia and elevated TSH  Patient diagnosed with hypothyroidism in 2011  Currently takes Synthroid 50 mcg daily  TSH : 3.04   on 2/12/2024    Complains of intermittent Night sweats   Not able to lose weight     Has anxiety and takes Pristiq  Pristiq started 2 years ago    Has regular periods but heavy  Not trying to get pregnant  Her  had vasectomy  Her father recently diagnosed with hypothyroidism    Component      Latest Ref Rng 1/6/2023 1/22/2024   WBC      3.40 - 10.80 10*3/mm3 5.31 (E) 7.15    RBC      3.77 - 5.28 10*6/mm3 4.83 (E) 4.86    Hemoglobin      12.0 - 15.9 g/dL 13.8 (E) 14.3    Hematocrit      34.0 - 46.6 % 42.7 (E) 42.1    MCV      79.0 - 97.0 fL 88.4 (E) 86.6    MCH      26.6 - 33.0 pg 28.6 (E) 29.4    MCHC      31.5 - 35.7 g/dL 32.3 (E) 34.0    RDW      12.3 - 15.4 % 12.7 (E) 12.1 (L)    Platelets      140 - 450 10*3/mm3 154 (E) 282    MPV      6.0 - 12.0 fL 11.5 (E) 10.1    Differential Type      (arb'U) Physician Office CBC w/AutoDiff (E)    Neutrophil Rel %      42.7 - 76.0 % 62.1 (E) 66.6    Basophil Rel %      0.0 - 1.5 % 1.1 (E) 0.6    Basophils Absolute      0.00 - 0.20 10*3/mm3 0.06 (E) 0.04    Eosinophil Rel %      0.3 - 6.2 % 1.5 (E) 0.6    Eosinophils Absolute      0.00 - 0.40 10*3/mm3 0.08 (E) 0.04    Immature Granulocyte Rel %      0.0 - 0.5 % 0.8 (E) 0.3    Immature Grans, Absolute      0.00 - 0.05 10*3/mm3 0.04 (E) 0.02    Lymphocytes Absolute      0.70 - 3.10 10*3/mm3 1.41 (E) 1.64    Lymphocyte Rel %      19.6 - 45.3 % 26.6 (E) 22.9    Monocytes Absolute      0.10 - 0.90 10*3/mm3 0.42 (E) 0.64    Monocyte Rel %      5.0 - 12.0 % 7.9 (E) 9.0    Neutrophils Absolute      1.70 - 7.00 10*3/mm3 3.30 (E) 4.77    RDW-SD      37.0 - 54.0  "fl  38.6    Sodium      136 - 145 mmol/L 137 (E) 136    Potassium      3.5 - 5.2 mmol/L 4.3 (E) 3.5    Chloride      98 - 107 mmol/L 105 (E) 101    CO2      22 - 29 mmol/L 18 (L) (E)    Anion Gap      5.0 - 15.0 mmol/L 14 (H) (E) 9.3    Glucose      65 - 99 mg/dL 80 (E) 99    BUN      6 - 20 mg/dL 14 (E) 15    Creatinine      0.57 - 1.00 mg/dL 0.76 (E) 0.78    BUN/Creatinine Ratio      7.0 - 25.0  17.8 (E) 19.2    Est GFR by Clearance      >60 /1.73 m2 >60 (E)    eGFR  Am      >60 /1.73 m2 >60 (E)    Total Protein      6.0 - 8.5 g/dL 7.5 (E) 7.6    Albumin      3.5 - 5.2 g/dL 4.5 (E) 4.8    Globulin      gm/dL 3.0 (E) 2.8    A/G Ratio      g/dL 1.5 (E) 1.7    Calcium      8.6 - 10.5 mg/dL 9.4 (E) 10.1    Total Bilirubin      0.0 - 1.2 mg/dL 0.4 (E) 0.3    AST (SGOT)      1 - 32 U/L 28 (E) 13    ALT (SGPT)      1 - 33 U/L 11 (E) 10    Alkaline Phosphatase      39 - 117 U/L 86 (E) 108    CO2      22.0 - 29.0 mmol/L  25.7    eGFR      >60.0 mL/min/1.73  99.8    Free T4      0.93 - 1.70 ng/dL 0.99 (E)    HCG Qualitative      Negative   Negative    TSH Baseline      0.270 - 4.200 uIU/mL  4.720 (H)    T4, Total      4.50 - 11.70 mcg/dL  7.94    T3, Total      80.0 - 200.0 ng/dl  123.0       Legend:  (L) Low  (H) High  (E) External lab result    Objective   Vital Signs:  /74   Pulse 72   Temp 97.2 °F (36.2 °C) (Temporal)   Ht 153.7 cm (60.51\")   Wt 70.6 kg (155 lb 9.6 oz)   SpO2 98%   BMI 29.88 kg/m²   Estimated body mass index is 29.88 kg/m² as calculated from the following:    Height as of this encounter: 153.7 cm (60.51\").    Weight as of this encounter: 70.6 kg (155 lb 9.6 oz).             Review of Systems   Constitutional:  Negative for unexpected weight change.   Cardiovascular:  Positive for palpitations.   Gastrointestinal:  Positive for constipation.   Endocrine: Positive for heat intolerance.   Neurological:  Negative for dizziness and light-headedness.   Psychiatric/Behavioral:  The patient " is nervous/anxious.         Physical Exam  Constitutional:       Appearance: Normal appearance.   HENT:      Head: Normocephalic and atraumatic.   Eyes:      Extraocular Movements: Extraocular movements intact.   Cardiovascular:      Rate and Rhythm: Normal rate and regular rhythm.   Pulmonary:      Effort: Pulmonary effort is normal.      Breath sounds: Normal breath sounds. No wheezing.   Abdominal:      Palpations: Abdomen is soft.      Tenderness: There is no abdominal tenderness.   Musculoskeletal:         General: No swelling. Normal range of motion.      Cervical back: Neck supple. No tenderness.   Skin:     General: Skin is dry.   Neurological:      Mental Status: She is alert and oriented to person, place, and time.   Psychiatric:         Mood and Affect: Mood normal.        Result Review :  The following data was reviewed by: Mahrokh Nokhbehzaeim, MD on 03/28/2024:  CMP          1/22/2024    08:41   CMP   Glucose 99    BUN 15    Creatinine 0.78    EGFR 99.8    Sodium 136    Potassium 3.5    Chloride 101    Calcium 10.1    Total Protein 7.6    Albumin 4.8    Globulin 2.8    Total Bilirubin 0.3    Alkaline Phosphatase 108    AST (SGOT) 13    ALT (SGPT) 10    Albumin/Globulin Ratio 1.7    BUN/Creatinine Ratio 19.2    Anion Gap 9.3      CBC          1/22/2024    08:41   CBC   WBC 7.15    RBC 4.86    Hemoglobin 14.3    Hematocrit 42.1    MCV 86.6    MCH 29.4    MCHC 34.0    RDW 12.1    Platelets 282      CBC w/diff          1/22/2024    08:41   CBC w/Diff   WBC 7.15    RBC 4.86    Hemoglobin 14.3    Hematocrit 42.1    MCV 86.6    MCH 29.4    MCHC 34.0    RDW 12.1    Platelets 282    Neutrophil Rel % 66.6    Immature Granulocyte Rel % 0.3    Lymphocyte Rel % 22.9    Monocyte Rel % 9.0    Eosinophil Rel % 0.6    Basophil Rel % 0.6        TSH          1/22/2024    08:41   TSH   TSH 4.720      CMP          1/22/2024    08:41   CMP   Glucose 99    BUN 15    Creatinine 0.78    EGFR 99.8    Sodium 136    Potassium 3.5  "   Chloride 101    Calcium 10.1    Total Protein 7.6    Albumin 4.8    Globulin 2.8    Total Bilirubin 0.3    Alkaline Phosphatase 108    AST (SGOT) 13    ALT (SGPT) 10    Albumin/Globulin Ratio 1.7    BUN/Creatinine Ratio 19.2    Anion Gap 9.3       TSH          1/22/2024    08:41   TSH   TSH 4.720       Free T4   Date Value Ref Range Status   01/06/2023 0.99 0.93 - 1.70 ng/dL Final      No results found for: \"T3FREE\"   Data reviewed : Radiologic studies CXR             Assessment and Plan   Diagnoses and all orders for this visit:    1. Acquired hypothyroidism (Primary)  Assessment & Plan:  Not trying to get pregnant  Complains of palpitation  Had a normal TSH in February 2024  Takes Synthroid 50 mcg daily  Repeat TFT today and adjust the dose of Synthroid accordingly    Orders:  -     TSH  -     T4, Free  -     T3, Free  -     Thyroglobulin Antibody  -     Thyroid Peroxidase Antibody  -     CBC & Differential  -     Vitamin B12 & Folate  -     Ferritin  -     Transferrin Saturation    2. Tachycardia  Assessment & Plan:  Evaluated by cardiologist  Takes metoprolol 25 mg twice daily  Given normal TSH in February it is unlikely to be related to the thyroid  Pristiq might be associated with tachycardia  Instructed to stay hydrated  will repeat TFT  Due to heavy menstrual cycles will assess her for iron deficiency anemia      Orders:  -     TSH  -     T4, Free  -     T3, Free  -     Thyroglobulin Antibody  -     Thyroid Peroxidase Antibody  -     CBC & Differential  -     Vitamin B12 & Folate  -     Ferritin  -     Transferrin Saturation             Follow Up   Return in about 6 months (around 9/28/2024).  Patient was given instructions and counseling regarding her condition or for health maintenance advice. Please see specific information pulled into the AVS if appropriate.       "

## 2024-03-28 NOTE — ASSESSMENT & PLAN NOTE
Not trying to get pregnant  Complains of palpitation  Had a normal TSH in February 2024  Takes Synthroid 50 mcg daily  Repeat TFT today and adjust the dose of Synthroid accordingly

## 2024-03-31 LAB
BASOPHILS # BLD AUTO: 0.1 X10E3/UL (ref 0–0.2)
BASOPHILS NFR BLD AUTO: 1 %
EOSINOPHIL # BLD AUTO: 0.1 X10E3/UL (ref 0–0.4)
EOSINOPHIL NFR BLD AUTO: 1 %
ERYTHROCYTE [DISTWIDTH] IN BLOOD BY AUTOMATED COUNT: 12.5 % (ref 11.7–15.4)
FERRITIN SERPL-MCNC: 51 NG/ML (ref 15–150)
FOLATE SERPL-MCNC: 4.4 NG/ML
HCT VFR BLD AUTO: 40.1 % (ref 34–46.6)
HGB BLD-MCNC: 13.4 G/DL (ref 11.1–15.9)
IMM GRANULOCYTES # BLD AUTO: 0 X10E3/UL (ref 0–0.1)
IMM GRANULOCYTES NFR BLD AUTO: 1 %
IRON SATN MFR SERPL: 22 % SATURATION
IRON SERPL-MCNC: 89 UG/DL
LYMPHOCYTES # BLD AUTO: 2.2 X10E3/UL (ref 0.7–3.1)
LYMPHOCYTES NFR BLD AUTO: 32 %
MCH RBC QN AUTO: 29.7 PG (ref 26.6–33)
MCHC RBC AUTO-ENTMCNC: 33.4 G/DL (ref 31.5–35.7)
MCV RBC AUTO: 89 FL (ref 79–97)
MONOCYTES # BLD AUTO: 0.5 X10E3/UL (ref 0.1–0.9)
MONOCYTES NFR BLD AUTO: 8 %
NEUTROPHILS # BLD AUTO: 4 X10E3/UL (ref 1.4–7)
NEUTROPHILS NFR BLD AUTO: 57 %
PLATELET # BLD AUTO: 273 X10E3/UL (ref 150–450)
RBC # BLD AUTO: 4.51 X10E6/UL (ref 3.77–5.28)
T3FREE SERPL-MCNC: 2.8 PG/ML (ref 2–4.4)
T4 FREE SERPL-MCNC: 1.1 NG/DL (ref 0.82–1.77)
THYROGLOB AB SERPL-ACNC: 1.4 IU/ML (ref 0–0.9)
THYROPEROXIDASE AB SERPL-ACNC: <9 IU/ML (ref 0–34)
TRANSFERRIN SERPL-MCNC: 287 MG/DL
TSH SERPL DL<=0.005 MIU/L-ACNC: 1.81 UIU/ML (ref 0.45–4.5)
VIT B12 SERPL-MCNC: 342 PG/ML (ref 232–1245)
WBC # BLD AUTO: 6.8 X10E3/UL (ref 3.4–10.8)

## 2024-04-19 ENCOUNTER — TELEPHONE (OUTPATIENT)
Age: 39
End: 2024-04-19

## 2024-08-08 NOTE — ASSESSMENT & PLAN NOTE
Patient reports that on twice daily dosing of metoprolol succinate she feels that her symptoms of tachycardia are more controlled  Heart rate 83 in clinic today  She has had abnormal TSH in the past and has an upcoming appointment for endocrinology   Detail Level: Zone

## 2024-11-11 ENCOUNTER — HOSPITAL ENCOUNTER (OUTPATIENT)
Dept: CARDIOLOGY | Facility: HOSPITAL | Age: 39
Discharge: HOME | End: 2024-11-11
Payer: COMMERCIAL

## 2024-11-11 ENCOUNTER — OFFICE VISIT (OUTPATIENT)
Dept: CARDIOLOGY | Facility: CLINIC | Age: 39
End: 2024-11-11
Payer: COMMERCIAL

## 2024-11-11 VITALS
OXYGEN SATURATION: 99 % | HEART RATE: 76 BPM | SYSTOLIC BLOOD PRESSURE: 120 MMHG | DIASTOLIC BLOOD PRESSURE: 78 MMHG | HEIGHT: 61 IN | BODY MASS INDEX: 29.27 KG/M2 | WEIGHT: 155 LBS

## 2024-11-11 DIAGNOSIS — R00.0 SINUS TACHYCARDIA: ICD-10-CM

## 2024-11-11 DIAGNOSIS — I10 PRIMARY HYPERTENSION: ICD-10-CM

## 2024-11-11 DIAGNOSIS — I10 PRIMARY HYPERTENSION: Primary | ICD-10-CM

## 2024-11-11 DIAGNOSIS — E78.2 MIXED HYPERLIPIDEMIA: ICD-10-CM

## 2024-11-11 DIAGNOSIS — R00.2 PALPITATIONS: ICD-10-CM

## 2024-11-11 LAB
BH CV STRESS BP STAGE 1: NORMAL
BH CV STRESS BP STAGE 2: NORMAL
BH CV STRESS BP STAGE 3: NORMAL
BH CV STRESS DURATION MIN STAGE 1: 3
BH CV STRESS DURATION MIN STAGE 2: 3
BH CV STRESS DURATION MIN STAGE 3: 2
BH CV STRESS DURATION SEC STAGE 1: 0
BH CV STRESS DURATION SEC STAGE 2: 0
BH CV STRESS DURATION SEC STAGE 3: 0
BH CV STRESS GRADE STAGE 1: 10
BH CV STRESS GRADE STAGE 2: 12
BH CV STRESS GRADE STAGE 3: 14
BH CV STRESS HR STAGE 1: 130
BH CV STRESS HR STAGE 2: 160
BH CV STRESS HR STAGE 3: 179
BH CV STRESS METS STAGE 1: 5
BH CV STRESS METS STAGE 2: 7.5
BH CV STRESS METS STAGE 3: 9
BH CV STRESS PROTOCOL 1: NORMAL
BH CV STRESS RECOVERY BP: NORMAL MMHG
BH CV STRESS RECOVERY HR: 117 BPM
BH CV STRESS SPEED STAGE 1: 1.7
BH CV STRESS SPEED STAGE 2: 2.5
BH CV STRESS SPEED STAGE 3: 3.4
BH CV STRESS STAGE 1: 1
BH CV STRESS STAGE 2: 2
BH CV STRESS STAGE 3: 3
MAXIMAL PREDICTED HEART RATE: 181 BPM
PERCENT MAX PREDICTED HR: 98.9 %
STRESS BASELINE BP: NORMAL MMHG
STRESS BASELINE HR: 109 BPM
STRESS PERCENT HR: 116 %
STRESS POST ESTIMATED WORKLOAD: 9 METS
STRESS POST EXERCISE DUR MIN: 8 MIN
STRESS POST EXERCISE DUR SEC: 0 SEC
STRESS POST PEAK BP: NORMAL MMHG
STRESS POST PEAK HR: 179 BPM
STRESS TARGET HR: 154 BPM

## 2024-11-11 PROCEDURE — 93016 CV STRESS TEST SUPVJ ONLY: CPT | Performed by: INTERNAL MEDICINE

## 2024-11-11 PROCEDURE — 93018 CV STRESS TEST I&R ONLY: CPT | Performed by: INTERNAL MEDICINE

## 2024-11-11 PROCEDURE — 93000 ELECTROCARDIOGRAM COMPLETE: CPT | Performed by: INTERNAL MEDICINE

## 2024-11-11 PROCEDURE — 93017 CV STRESS TEST TRACING ONLY: CPT

## 2024-11-11 PROCEDURE — 99214 OFFICE O/P EST MOD 30 MIN: CPT | Performed by: INTERNAL MEDICINE

## 2024-11-11 NOTE — PROGRESS NOTES
"      CARDIOLOGY    Marleny Morales MD    ENCOUNTER DATE:  11/11/2024    Marleny Wen / 39 y.o. / female        CHIEF COMPLAINT / REASON FOR OFFICE VISIT     Follow-up      HISTORY OF PRESENT ILLNESS       HPI    Marleny Wen is a 39 y.o. female     I saw her in January 2024 after a visit to the emergency room with noncardiac chest pain.  She had a negative D-dimer and troponin.  Her blood pressure was elevated.  Echocardiogram February 2024 was normal.  Her medication was adjusted for heart rate and blood pressure control.    She has had some worsening shortness of breath and is concerned about it.  She does note that she has anxiety.  She has been monitoring her blood pressure at home and she has occasional elevated numbers but all in all is well-controlled.    VITAL SIGNS     Visit Vitals  /78   Pulse 76   Ht 153.7 cm (60.51\")   Wt 70.3 kg (155 lb)   SpO2 99%   BMI 29.76 kg/m²         Wt Readings from Last 3 Encounters:   11/11/24 70.3 kg (155 lb)   03/28/24 70.6 kg (155 lb 9.6 oz)   03/14/24 68.9 kg (152 lb)     Body mass index is 29.76 kg/m².      PHYSICAL EXAMINATION     Constitutional:       General: Not in acute distress.  Neck:      Vascular: No carotid bruit or JVD.   Pulmonary:      Effort: Pulmonary effort is normal.      Breath sounds: Normal breath sounds.   Cardiovascular:      Normal rate. Regular rhythm.      Murmurs: There is no murmur.   Psychiatric:         Mood and Affect: Mood and affect normal.           REVIEWED DATA       ECG 12 Lead    Date/Time: 11/11/2024 12:16 PM  Performed by: Marleny Morales MD    Authorized by: Marleny Morales MD  Comparison: compared with previous ECG from 1/31/2024  Similar to previous ECG  Rhythm: sinus rhythm  BPM: 76  Conduction: conduction normal  Other findings: non-specific ST-T wave changes    Clinical impression: abnormal EKG                Lab Results   Component Value Date    GLUCOSE 99 01/22/2024    BUN 15 01/22/2024    CREATININE 0.78 " 01/22/2024     01/22/2024    K 3.5 01/22/2024     01/22/2024    CALCIUM 10.1 01/22/2024    PROTEINTOT 7.6 01/22/2024    ALBUMIN 4.8 01/22/2024    ALT 10 01/22/2024    AST 13 01/22/2024    ALKPHOS 108 01/22/2024    BILITOT 0.3 01/22/2024    GLOB 2.8 01/22/2024    AGRATIO 1.7 01/22/2024    BCR 19.2 01/22/2024    ANIONGAP 9.3 01/22/2024    EGFR 99.8 01/22/2024       ASSESSMENT & PLAN      Diagnosis Plan   1. Primary hypertension  Treadmill Stress Test      2. Mixed hyperlipidemia  Treadmill Stress Test      3. Sinus tachycardia  Treadmill Stress Test      4. Palpitations  Treadmill Stress Test            1.  Dyspnea on exertion.  She had a structurally normal heart by echocardiogram February 2024.  I am going to check a treadmill stress test today.  2.  Hypertension.  Blood pressure is well-controlled.  3.  Sinus tachycardia.  Better with beta-blockers.  4.  Abnormal EKG with nonspecific ST changes.  5.  Anxiety.      Orders Placed This Encounter   Procedures    Treadmill Stress Test     Standing Status:   Future     Standing Expiration Date:   11/11/2025     Order Specific Question:   Reason for exam?     Answer:   Angina     Order Specific Question:   Release to patient     Answer:   Routine Release [[id number]]    ECG 12 Lead     This order was created via procedure documentation     Order Specific Question:   Release to patient     Answer:   Routine Release [[id number]]           MEDICATIONS         Discharge Medications            Accurate as of November 11, 2024 12:17 PM. If you have any questions, ask your nurse or doctor.                Continue These Medications        Instructions Start Date   amLODIPine 2.5 MG tablet  Commonly known as: NORVASC   2.5 mg, Oral, Daily      cetirizine 5 MG tablet  Commonly known as: zyrTEC   5 mg, Daily      desvenlafaxine 50 MG 24 hr tablet  Commonly known as: PRISTIQ   50 mg, Daily      hydrOXYzine 25 MG tablet  Commonly known as: ATARAX   25 mg, Every 4 Hours  PRN      levothyroxine 50 MCG tablet  Commonly known as: SYNTHROID, LEVOTHROID   50 mcg, Daily      metoprolol tartrate 25 MG tablet  Commonly known as: LOPRESSOR   25 mg, Oral, 2 Times Daily      Rimegepant Sulfate 75 MG tablet dispersible tablet  Commonly known as: NURTEC   1 tablet, Daily PRN             Stop These Medications      Dapsone 5 % topical gel  Stopped by: Marleny Morales     tretinoin 0.025 % cream  Commonly known as: RETIN-A  Stopped by: Marleny Morales MD  11/11/24  12:17 EST    Part of this note may be an electronic transcription/translation of spoken language to printed text using the Dragon dictation system.

## 2025-01-13 RX ORDER — AMLODIPINE BESYLATE 2.5 MG/1
2.5 TABLET ORAL DAILY
Qty: 90 TABLET | Refills: 3 | Status: SHIPPED | OUTPATIENT
Start: 2025-01-13

## 2025-04-23 RX ORDER — METOPROLOL TARTRATE 25 MG/1
25 TABLET, FILM COATED ORAL EVERY 12 HOURS SCHEDULED
Qty: 180 TABLET | Refills: 0 | Status: SHIPPED | OUTPATIENT
Start: 2025-04-23

## 2025-07-21 RX ORDER — METOPROLOL TARTRATE 25 MG/1
25 TABLET, FILM COATED ORAL EVERY 12 HOURS SCHEDULED
Qty: 180 TABLET | Refills: 0 | Status: SHIPPED | OUTPATIENT
Start: 2025-07-21